# Patient Record
Sex: FEMALE | Race: WHITE | NOT HISPANIC OR LATINO | URBAN - METROPOLITAN AREA
[De-identification: names, ages, dates, MRNs, and addresses within clinical notes are randomized per-mention and may not be internally consistent; named-entity substitution may affect disease eponyms.]

---

## 2023-04-17 DIAGNOSIS — B00.9 HERPES: Primary | ICD-10-CM

## 2023-04-17 RX ORDER — CYCLOSPORINE 0.5 MG/ML
EMULSION OPHTHALMIC EVERY 12 HOURS
COMMUNITY
Start: 2022-10-04 | End: 2023-10-05 | Stop reason: SDUPTHER

## 2023-04-17 RX ORDER — VALACYCLOVIR HYDROCHLORIDE 500 MG/1
500 TABLET, FILM COATED ORAL DAILY
COMMUNITY
End: 2023-04-17 | Stop reason: SDUPTHER

## 2023-04-17 RX ORDER — BUPROPION HYDROCHLORIDE 150 MG/1
1 TABLET ORAL DAILY
COMMUNITY

## 2023-04-17 RX ORDER — VALACYCLOVIR HYDROCHLORIDE 500 MG/1
500 TABLET, FILM COATED ORAL DAILY
Qty: 90 TABLET | Refills: 1 | Status: SHIPPED | OUTPATIENT
Start: 2023-04-17 | End: 2023-12-18

## 2023-04-19 ENCOUNTER — TELEPHONE (OUTPATIENT)
Dept: PRIMARY CARE | Facility: CLINIC | Age: 33
End: 2023-04-19
Payer: COMMERCIAL

## 2023-04-19 DIAGNOSIS — Z12.31 ENCOUNTER FOR SCREENING MAMMOGRAM FOR BREAST CANCER: Primary | ICD-10-CM

## 2023-06-06 LAB
ANION GAP IN SER/PLAS: 11 MMOL/L (ref 10–20)
BASOPHILS (10*3/UL) IN BLOOD BY AUTOMATED COUNT: 0.04 X10E9/L (ref 0–0.1)
BASOPHILS/100 LEUKOCYTES IN BLOOD BY AUTOMATED COUNT: 0.7 % (ref 0–2)
CALCIUM (MG/DL) IN SER/PLAS: 9.7 MG/DL (ref 8.6–10.3)
CARBON DIOXIDE, TOTAL (MMOL/L) IN SER/PLAS: 30 MMOL/L (ref 21–32)
CHLORIDE (MMOL/L) IN SER/PLAS: 100 MMOL/L (ref 98–107)
CREATININE (MG/DL) IN SER/PLAS: 0.97 MG/DL (ref 0.5–1.05)
CREATININE (MG/DL) IN URINE: 41.2 MG/DL (ref 20–320)
EOSINOPHILS (10*3/UL) IN BLOOD BY AUTOMATED COUNT: 0.24 X10E9/L (ref 0–0.7)
EOSINOPHILS/100 LEUKOCYTES IN BLOOD BY AUTOMATED COUNT: 4.3 % (ref 0–6)
ERYTHROCYTE DISTRIBUTION WIDTH (RATIO) BY AUTOMATED COUNT: 12.4 % (ref 11.5–14.5)
ERYTHROCYTE MEAN CORPUSCULAR HEMOGLOBIN CONCENTRATION (G/DL) BY AUTOMATED: 33.9 G/DL (ref 32–36)
ERYTHROCYTE MEAN CORPUSCULAR VOLUME (FL) BY AUTOMATED COUNT: 94 FL (ref 80–100)
ERYTHROCYTES (10*6/UL) IN BLOOD BY AUTOMATED COUNT: 4.62 X10E12/L (ref 4–5.2)
GFR FEMALE: 79 ML/MIN/1.73M2
GLUCOSE (MG/DL) IN SER/PLAS: 80 MG/DL (ref 74–99)
HEMATOCRIT (%) IN BLOOD BY AUTOMATED COUNT: 43.3 % (ref 36–46)
HEMOGLOBIN (G/DL) IN BLOOD: 14.7 G/DL (ref 12–16)
IMMATURE GRANULOCYTES/100 LEUKOCYTES IN BLOOD BY AUTOMATED COUNT: 0.4 % (ref 0–0.9)
LEUKOCYTES (10*3/UL) IN BLOOD BY AUTOMATED COUNT: 5.6 X10E9/L (ref 4.4–11.3)
LYMPHOCYTES (10*3/UL) IN BLOOD BY AUTOMATED COUNT: 1.55 X10E9/L (ref 1.2–4.8)
LYMPHOCYTES/100 LEUKOCYTES IN BLOOD BY AUTOMATED COUNT: 27.9 % (ref 13–44)
MONOCYTES (10*3/UL) IN BLOOD BY AUTOMATED COUNT: 0.48 X10E9/L (ref 0.1–1)
MONOCYTES/100 LEUKOCYTES IN BLOOD BY AUTOMATED COUNT: 8.6 % (ref 2–10)
NEUTROPHILS (10*3/UL) IN BLOOD BY AUTOMATED COUNT: 3.23 X10E9/L (ref 1.2–7.7)
NEUTROPHILS/100 LEUKOCYTES IN BLOOD BY AUTOMATED COUNT: 58.1 % (ref 40–80)
PLATELETS (10*3/UL) IN BLOOD AUTOMATED COUNT: 300 X10E9/L (ref 150–450)
POTASSIUM (MMOL/L) IN SER/PLAS: 3.7 MMOL/L (ref 3.5–5.3)
PROTEIN (MG/DL) IN URINE: <4 MG/DL (ref 5–24)
PROTEIN/CREATININE (MG/MG) IN URINE: ABNORMAL MG/MG CREAT (ref 0–0.17)
SODIUM (MMOL/L) IN SER/PLAS: 137 MMOL/L (ref 136–145)
UREA NITROGEN (MG/DL) IN SER/PLAS: 12 MG/DL (ref 6–23)

## 2023-09-15 PROBLEM — R53.83 FATIGUE: Status: ACTIVE | Noted: 2023-09-15

## 2023-09-15 PROBLEM — F41.9 ANXIETY: Status: ACTIVE | Noted: 2023-09-15

## 2023-09-15 PROBLEM — N63.0 BREAST LUMP: Status: ACTIVE | Noted: 2023-09-15

## 2023-09-15 PROBLEM — Q87.19 SJOGREN-LARSSON SYNDROME (HHS-HCC): Status: ACTIVE | Noted: 2023-09-15

## 2023-09-15 PROBLEM — M32.9 SLE (SYSTEMIC LUPUS ERYTHEMATOSUS) (MULTI): Status: ACTIVE | Noted: 2023-09-15

## 2023-09-15 PROBLEM — R21 MALAR RASH: Status: ACTIVE | Noted: 2023-09-15

## 2023-09-15 PROBLEM — J20.9 ACUTE BRONCHITIS: Status: ACTIVE | Noted: 2023-09-15

## 2023-09-15 PROBLEM — R76.8 ANA POSITIVE: Status: ACTIVE | Noted: 2023-09-15

## 2023-09-15 PROBLEM — M35.00 SICCA SYNDROME (MULTI): Status: ACTIVE | Noted: 2023-09-15

## 2023-09-15 PROBLEM — Z97.3 WEARS CONTACT LENSES: Status: ACTIVE | Noted: 2023-09-15

## 2023-09-15 PROBLEM — E03.9 HYPOTHYROIDISM: Status: ACTIVE | Noted: 2023-09-15

## 2023-09-15 PROBLEM — M35.00 SJOGREN'S SYNDROME (MULTI): Status: ACTIVE | Noted: 2023-09-15

## 2023-09-15 PROBLEM — B00.1 RECURRENT COLD SORES: Status: ACTIVE | Noted: 2023-09-15

## 2023-09-15 RX ORDER — AMOXICILLIN 875 MG/1
1 TABLET, FILM COATED ORAL EVERY 12 HOURS
COMMUNITY
Start: 2023-06-06

## 2023-09-15 RX ORDER — HYDROXYCHLOROQUINE SULFATE 200 MG/1
1 TABLET, FILM COATED ORAL 2 TIMES DAILY
COMMUNITY
Start: 2022-10-04 | End: 2024-04-26 | Stop reason: SDUPTHER

## 2023-09-15 RX ORDER — LEVOTHYROXINE SODIUM 125 UG/1
1 TABLET ORAL DAILY
COMMUNITY
Start: 2019-09-12 | End: 2023-09-29

## 2023-09-29 DIAGNOSIS — E03.9 HYPOTHYROIDISM, UNSPECIFIED: ICD-10-CM

## 2023-09-29 RX ORDER — LEVOTHYROXINE SODIUM 125 UG/1
125 TABLET ORAL DAILY
Qty: 90 TABLET | Refills: 0 | Status: SHIPPED | OUTPATIENT
Start: 2023-09-29 | End: 2023-12-20

## 2023-10-05 DIAGNOSIS — M35.01 SJOGREN'S SYNDROME WITH KERATOCONJUNCTIVITIS SICCA (MULTI): Primary | ICD-10-CM

## 2023-10-05 RX ORDER — CYCLOSPORINE 0.5 MG/ML
1 EMULSION OPHTHALMIC EVERY 12 HOURS
Qty: 60 EACH | Refills: 3 | Status: SHIPPED | OUTPATIENT
Start: 2023-10-05 | End: 2023-10-30 | Stop reason: ALTCHOICE

## 2023-10-09 ENCOUNTER — SPECIALTY PHARMACY (OUTPATIENT)
Dept: PHARMACY | Facility: CLINIC | Age: 33
End: 2023-10-09

## 2023-10-17 ENCOUNTER — TELEMEDICINE (OUTPATIENT)
Dept: RHEUMATOLOGY | Facility: CLINIC | Age: 33
End: 2023-10-17
Payer: COMMERCIAL

## 2023-10-17 DIAGNOSIS — M35.00 SJOGREN'S SYNDROME, WITH UNSPECIFIED ORGAN INVOLVEMENT (MULTI): Primary | ICD-10-CM

## 2023-10-17 DIAGNOSIS — R92.8 ABNORMAL MAMMOGRAM OF LEFT BREAST: ICD-10-CM

## 2023-10-17 DIAGNOSIS — M32.9 SYSTEMIC LUPUS ERYTHEMATOSUS, UNSPECIFIED SLE TYPE, UNSPECIFIED ORGAN INVOLVEMENT STATUS (MULTI): ICD-10-CM

## 2023-10-17 PROCEDURE — 99214 OFFICE O/P EST MOD 30 MIN: CPT | Performed by: INTERNAL MEDICINE

## 2023-10-17 ASSESSMENT — ENCOUNTER SYMPTOMS
DEPRESSION: 0
OCCASIONAL FEELINGS OF UNSTEADINESS: 0
LOSS OF SENSATION IN FEET: 0

## 2023-10-17 ASSESSMENT — PATIENT HEALTH QUESTIONNAIRE - PHQ9
2. FEELING DOWN, DEPRESSED OR HOPELESS: NOT AT ALL
SUM OF ALL RESPONSES TO PHQ9 QUESTIONS 1 AND 2: 0
1. LITTLE INTEREST OR PLEASURE IN DOING THINGS: NOT AT ALL

## 2023-10-17 NOTE — PATIENT INSTRUCTIONS
We will start appeal to try to get Restasis covered.  Check labs December 2023: CBC with differential, BMP, double-stranded DNA, C3, C4, spot urine protein to creatinine ratio.  Please have a copy of your eye exam from January faxed to my office at [phone].  Follow-up in 4 months.

## 2023-10-17 NOTE — PROGRESS NOTES
"Subjective   Patient ID: Rajwinder Marley is a 32 y.o. female who presents for Follow-up. Virtual visit is being done.    HPI   32 year old female here for follow-up regarding SLE and Sjogren syndrome. (NICK 1:640 homogeneous pattern, SSA+, SSB-, dsDNA negative).    She developed symptoms of dry eyes and dry mouth 4/21.  She does wear contacts, but she was needing to use rewetting drops approximately every 5 minutes.  They tried placing lacrimal plugs which were ineffective.    She also developed a malar rash    She started treatment with Restasis, which has helped.  She states that Restasis has been \"life changing.\"  Restasis has helped tremendously with her dry eyes (she had previously failed over-the-counter tears and lacrimal plugs).  However, her insurance is again not covering the Restasis.  She is still using Biotene eyedrops. She needs to use drops frequently (sometimes every 5 minutes, sometimes every 30 minutes which is an improvement from what she was doing previously.)  She had an eye exam by [facility] January 2023. Reportedly she return for baseline 10-2 VF and OCT. I have asked her to send a copy of this to me.    She also started hydroxychloroquine 10/22 because she showed me photographs consistent with a malar rash (consistent with acute cutaneous lupus).  The rash resolved and she has not had a recurrence.    She also has dry mouth symptoms, which she is managing with drinking water frequently.  She has not been having excessive dental caries.  She sees her dentist every 6 months.  She notes that sometimes she has what she refers to as gum problems if she does not floss twice daily.    She currently denies joint pain.  She denies history of Raynaud's phenomenon.  She has no history of miscarriage or DVT.  She has some hair loss but it is not excessive.    Palpable lump in her right breast 7/23 was determined on mammogram and ultrasound to be a cluster of cysts.  She did have some calcifications in " the left breast that were felt to likely be benign, but 6-month follow-up was recommended (this is scheduled for January 9, 2024).      Labs August 2022: CBC normal, CMP normal, ESR 14, CRP 0.54 (normal less than 1), TSH 2.03, vitamin B12 470, NICK 1:640 (homogeneous), SSA+, SSB-, dsDNA negative, Estrella and RNP negative, urine microalbumin negative  Labs November 2022: Anticardiolipin antibody negative, beta-2 glycoprotein antibody negative, lupus anticoagulant negative, C3 and C4 normal  Labs June 2023: CBC with differential normal, BMP normal, spot urine protein to creatinine ratio 0    Medical problem list:  - SLE   -Sjogren's syndrome   -Hypothyroidism (onset while she was in high school)   -Anxiety    Medications: Reviewed as documented  Surgeries: None    Social history: .   Has 2 children-her wife was a biologic carrier of the children. Children are ages 3 years and 16 months.   Occupation: .   Denies use of tobacco and alcohol.    Family history: Mother, 2 maternal aunts, and maternal grandmother have hypothyroidism.   Denies history of Sjogren's syndrome, SLE, or rheumatoid arthritis.    ROS:  General: Denies fevers or chills.  CV: Denies chest pain or palpitations.  Denies leg edema.  Lungs: Denies coughing or shortness of breath.  Skin: Denies rashes or nodules.  MS: Denies joint pain or joint swelling.     Objective   There were no vitals taken for this visit.    Physical Exam  HEENT: External exam of eyes and ears is normal.  No malar rash present. No parotid swelling.  CV: No apparent edema.  Lungs: Normal respiratory effort.  Neuro: Affect appropriate.    Assessment/Plan   Problem List Items Addressed This Visit             ICD-10-CM    SLE (systemic lupus erythematosus) (CMS/[facility]) M32.9    Sjogren's syndrome (CMS/[facility]) - Primary M35.00     Other Visit Diagnoses         Codes    Abnormal mammogram of left breast     R92.8           SLE and Sjogren's syndrome-had onset of  "symptoms of dry eyes , dry mouth, and malar rash approximately April 2021.  Labs remarkable for positive NICK 1:640 with positive SSA antibody.    Symptoms have improved with addition of hydroxychloroquine (started 10/22) and Restasis,  She states that Restasis has been \"life-changing\".  She previously failed over-the-counter artificial tears and lacrimal plugs.  Her insurance is not covering the Restasis, so we will need to do an appeal.  She had baseline eye exam January 2023 with [facility], returned for for 10-2 VF and OCT.  I requested again that she send me a copy of the eye exam.    Lump in right lateral breast 7/23-correlated with a cluster of cysts on mammogram and ultrasound July 2023.  She does have order likely benign calcifications in the left breast, for which 6-month follow-up mammogram was recommended (scheduled for January 9, 2024).      Plan:  We will start appeal to try to get Restasis covered.  Check labs December 2023: CBC with differential, BMP, double-stranded DNA, C3, C4, spot urine protein to creatinine ratio.  Please have a copy of your eye exam from January faxed to my office at [phone].  Follow-up in 4 months.    "

## 2023-10-30 DIAGNOSIS — M35.01 SJOGREN'S SYNDROME WITH KERATOCONJUNCTIVITIS SICCA (MULTI): ICD-10-CM

## 2023-10-30 DIAGNOSIS — M35.00 SJOGREN'S SYNDROME, WITH UNSPECIFIED ORGAN INVOLVEMENT (MULTI): Primary | ICD-10-CM

## 2023-10-30 DIAGNOSIS — M35.00 SJOGREN'S SYNDROME, WITH UNSPECIFIED ORGAN INVOLVEMENT (MULTI): ICD-10-CM

## 2023-10-30 RX ORDER — CYCLOSPORINE 0.5 MG/ML
EMULSION OPHTHALMIC
Qty: 60 EACH | Refills: 3 | Status: SHIPPED | OUTPATIENT
Start: 2023-10-30 | End: 2023-12-28

## 2023-10-30 RX ORDER — CYCLOSPORINE 0.5 MG/ML
1 EMULSION OPHTHALMIC EVERY 12 HOURS
Qty: 60 EACH | Refills: 3 | Status: SHIPPED | OUTPATIENT
Start: 2023-10-30 | End: 2023-10-30 | Stop reason: SDUPTHER

## 2023-11-03 ENCOUNTER — PHARMACY VISIT (OUTPATIENT)
Dept: PHARMACY | Facility: CLINIC | Age: 33
End: 2023-11-03
Payer: COMMERCIAL

## 2023-11-29 DIAGNOSIS — M35.01 SJOGREN SYNDROME WITH KERATOCONJUNCTIVITIS (MULTI): Primary | ICD-10-CM

## 2023-12-07 ENCOUNTER — SPECIALTY PHARMACY (OUTPATIENT)
Dept: PHARMACY | Facility: CLINIC | Age: 33
End: 2023-12-07

## 2023-12-07 PROCEDURE — RXMED WILLOW AMBULATORY MEDICATION CHARGE

## 2023-12-08 ENCOUNTER — PHARMACY VISIT (OUTPATIENT)
Dept: PHARMACY | Facility: CLINIC | Age: 33
End: 2023-12-08
Payer: COMMERCIAL

## 2023-12-17 DIAGNOSIS — B00.9 HERPES: ICD-10-CM

## 2023-12-18 RX ORDER — VALACYCLOVIR HYDROCHLORIDE 500 MG/1
500 TABLET, FILM COATED ORAL DAILY
Qty: 90 TABLET | Refills: 1 | Status: SHIPPED | OUTPATIENT
Start: 2023-12-18 | End: 2024-02-05 | Stop reason: SDUPTHER

## 2023-12-20 DIAGNOSIS — E03.9 HYPOTHYROIDISM, UNSPECIFIED: ICD-10-CM

## 2023-12-20 RX ORDER — LEVOTHYROXINE SODIUM 125 UG/1
125 TABLET ORAL DAILY
Qty: 90 TABLET | Refills: 0 | Status: SHIPPED | OUTPATIENT
Start: 2023-12-20 | End: 2024-03-18

## 2023-12-28 ENCOUNTER — OFFICE VISIT (OUTPATIENT)
Dept: OPHTHALMOLOGY | Facility: CLINIC | Age: 33
End: 2023-12-28
Payer: COMMERCIAL

## 2023-12-28 DIAGNOSIS — H52.13 MYOPIA OF BOTH EYES: Primary | ICD-10-CM

## 2023-12-28 DIAGNOSIS — H52.223 REGULAR ASTIGMATISM OF BOTH EYES: ICD-10-CM

## 2023-12-28 DIAGNOSIS — Z51.81 ENCOUNTER FOR MONITORING OF HYDROXYCHLOROQUINE THERAPY: ICD-10-CM

## 2023-12-28 DIAGNOSIS — Z79.899 ENCOUNTER FOR MONITORING OF HYDROXYCHLOROQUINE THERAPY: ICD-10-CM

## 2023-12-28 DIAGNOSIS — H47.231 OPTIC CUPPING OF RIGHT EYE: ICD-10-CM

## 2023-12-28 DIAGNOSIS — H16.223 KERATOCONJUNCTIVITIS SICCA OF BOTH EYES DUE TO DECREASED TEAR PRODUCTION: ICD-10-CM

## 2023-12-28 DIAGNOSIS — M35.01 SJOGREN SYNDROME WITH KERATOCONJUNCTIVITIS (MULTI): ICD-10-CM

## 2023-12-28 PROCEDURE — 92083 EXTENDED VISUAL FIELD XM: CPT | Performed by: OPTOMETRIST

## 2023-12-28 PROCEDURE — 92015 DETERMINE REFRACTIVE STATE: CPT | Performed by: OPTOMETRIST

## 2023-12-28 PROCEDURE — V2520 CONTACT LENS HYDROPHILIC: HCPCS | Performed by: OPTOMETRIST

## 2023-12-28 PROCEDURE — 92134 CPTRZ OPH DX IMG PST SGM RTA: CPT | Mod: BILATERAL PROCEDURE | Performed by: OPTOMETRIST

## 2023-12-28 PROCEDURE — 92004 COMPRE OPH EXAM NEW PT 1/>: CPT | Performed by: OPTOMETRIST

## 2023-12-28 PROCEDURE — FLVLG CONTACT LENS EVAL - LEVEL 2 (SP): Performed by: OPTOMETRIST

## 2023-12-28 RX ORDER — CYCLOSPORINE 0 G/ML
1 SOLUTION/ DROPS OPHTHALMIC; TOPICAL 2 TIMES DAILY
Qty: 60 EACH | Refills: 3 | Status: SHIPPED | OUTPATIENT
Start: 2023-12-28

## 2023-12-28 ASSESSMENT — REFRACTION_CURRENTRX
OS_BASECURVE: 8.6
OD_SPHERE: -3.25
OS_BRAND: CLARITI 1 DAY
OS_CYLINDER: SPHERE
OS_DIAMETER: 14.1
OS_SPHERE: -3.25
OD_DIAMETER: 14.1
OD_BASECURVE: 8.6
OD_CYLINDER: SPHERE
OD_BRAND: CLARITI 1 DAY

## 2023-12-28 ASSESSMENT — ENCOUNTER SYMPTOMS
EYES NEGATIVE: 1
ENDOCRINE NEGATIVE: 1
NEUROLOGICAL NEGATIVE: 0
CARDIOVASCULAR NEGATIVE: 0
HEMATOLOGIC/LYMPHATIC NEGATIVE: 0
ALLERGIC/IMMUNOLOGIC NEGATIVE: 1
GASTROINTESTINAL NEGATIVE: 0
MUSCULOSKELETAL NEGATIVE: 0
RESPIRATORY NEGATIVE: 0
CONSTITUTIONAL NEGATIVE: 0
PSYCHIATRIC NEGATIVE: 0

## 2023-12-28 ASSESSMENT — REFRACTION_MANIFEST
OS_CYLINDER: -0.50
OS_AXIS: 068
OD_CYLINDER: -1.25
OS_SPHERE: -3.00
OD_AXIS: 124
OD_SPHERE: -2.50

## 2023-12-28 ASSESSMENT — CONF VISUAL FIELD
OS_SUPERIOR_TEMPORAL_RESTRICTION: 0
OD_INFERIOR_TEMPORAL_RESTRICTION: 0
OS_INFERIOR_TEMPORAL_RESTRICTION: 0
OD_SUPERIOR_NASAL_RESTRICTION: 0
METHOD: COUNTING FINGERS
OS_NORMAL: 1
OD_INFERIOR_NASAL_RESTRICTION: 0
OS_SUPERIOR_NASAL_RESTRICTION: 0
OD_SUPERIOR_TEMPORAL_RESTRICTION: 0
OS_INFERIOR_NASAL_RESTRICTION: 0
OD_NORMAL: 1

## 2023-12-28 ASSESSMENT — SLIT LAMP EXAM - LIDS: COMMENTS: 2+ MEIBOMIAN GLAND DYSFUNCTION

## 2023-12-28 ASSESSMENT — TONOMETRY
IOP_METHOD: GOLDMANN APPLANATION
OS_IOP_MMHG: 13
OD_IOP_MMHG: 14

## 2023-12-28 ASSESSMENT — CUP TO DISC RATIO: OD_RATIO: 0.55

## 2023-12-28 ASSESSMENT — TEAR MENISCUS
OD_TEAR_MENISCUS: LOW
OS_TEAR_MENISCUS: LOW

## 2023-12-28 ASSESSMENT — VISUAL ACUITY
OS_CC: 20/20
CORRECTION_TYPE: CONTACTS
OS_CC+: -1
OD_CC+: -1
OD_CC: 20/20
METHOD: SNELLEN - LINEAR

## 2023-12-28 ASSESSMENT — EXTERNAL EXAM - RIGHT EYE: OD_EXAM: NORMAL

## 2023-12-28 ASSESSMENT — TEAR BREAK UP TIME (TBUT)
OS_TBUT: 1
OD_TBUT: 1

## 2023-12-28 ASSESSMENT — PACHYMETRY
OD_CT(UM): 526
OS_CT(UM): 531

## 2023-12-28 ASSESSMENT — EXTERNAL EXAM - LEFT EYE: OS_EXAM: NORMAL

## 2023-12-28 NOTE — PROGRESS NOTES
Assessment/Plan   Diagnoses and all orders for this visit:  Myopia of both eyes  Regular astigmatism of both eyes  New spec rx released today per patient request. Ocular health wnl for age OU. Monitor 1 year or sooner prn. Refraction billed today.  Discussed proper wear, care, replacement of contact lenses. Gave handout. D/c cl wear and RTC if eyes become red, painful, irritated. Monitor 1 year.   CL eval billed today. $55 Trials of infuse ordered, patient switch if prefers. Cl Rx finalized.     Sjogren syndrome with keratoconjunctivitis (CMS/[facility])  Encounter for monitoring of hydroxychloroquine therapy  -     Martin Visual Field - OU - Both Eyes  -     OCT, Retina - OU - Both Eyes  Studies reveal that the risk of maculopathy when taking Plaquenil is 0% (0-5 years), 1% (over 5 years), 2% (over 10 years), and 20% cumulative, or 4% annual incidence (over 20 years) respectively.  Annual testing with 10-2 threshold visual field perimetry, as well as spectral domain optical coherence tomography of the macula is recommended. No signs of plaquenil toxicity today od/os, macula OCT today, HVF 10-2 today  monitor 1 year or sooner with any acute vision change. HVF 10-2 and OCT Mac at next comprehensive exam.    Keratoconjunctivitis sicca of both eyes due to decreased tear production  -     cycloSPORINE (Cequa) 0.09 % dropperette; Administer 1 drop into affected eye(s) 2 times a day.  Patient educated on exam findings. Patient failed treatement with aqueous and lipid based Ats for 120 days and restasis/cyclosporine 0.05%. Start Cequa OU  bid. Discussed SEs of drug. Discussed lag for full efficacy of drug. RTC 4 months for osmolarity, OSDI, Schirmers B and SLE.  Add WC qday OU x 8-10min.     Optic cupping of right eye  Stable. No evidence of glaucomatous change. Physiologic asymmetry. Monitor 1 year.

## 2023-12-28 NOTE — Clinical Note
No plaquenil toxicity. Pt has failed tx w/ restasis, switching to alternative treatments for dry eye, will monitor in 4 months.

## 2023-12-28 NOTE — Clinical Note
Both eyes (OU) Contact Lens Order  Quantity: 1 Package: 90 PK Appointment needed? No Medically necessary? No Ship To: Home Additional instructions: Please order 1 90pk of clariti to mail to patient. Please also order 3 trial pack of the infuse lenses (Rx 2). Did not have trials in office today and patient is considering switching if better than clariti. Please mail to patient. Charges for clariti 90pk in epic.

## 2024-01-09 ENCOUNTER — ANCILLARY PROCEDURE (OUTPATIENT)
Dept: RADIOLOGY | Facility: CLINIC | Age: 34
End: 2024-01-09
Payer: COMMERCIAL

## 2024-01-09 DIAGNOSIS — R92.8 OTHER ABNORMAL AND INCONCLUSIVE FINDINGS ON DIAGNOSTIC IMAGING OF BREAST: ICD-10-CM

## 2024-01-09 PROCEDURE — 77065 DX MAMMO INCL CAD UNI: CPT | Mod: LEFT SIDE | Performed by: STUDENT IN AN ORGANIZED HEALTH CARE EDUCATION/TRAINING PROGRAM

## 2024-01-09 PROCEDURE — 77065 DX MAMMO INCL CAD UNI: CPT | Mod: LT

## 2024-01-10 ENCOUNTER — TELEPHONE (OUTPATIENT)
Dept: PRIMARY CARE | Facility: CLINIC | Age: 34
End: 2024-01-10

## 2024-01-10 ENCOUNTER — APPOINTMENT (OUTPATIENT)
Dept: PRIMARY CARE | Facility: CLINIC | Age: 34
End: 2024-01-10
Payer: COMMERCIAL

## 2024-01-10 NOTE — TELEPHONE ENCOUNTER
1/10/24 sent My Chart message:     Rajwinder-    Just reaching out to you to see if we could rescheduled your physical/ annual appointment that we had to cancel due to Dr. Harris was not in office the day it was scheduled.    Please call office at [phone]    Thanks  Sade Harris     (-was on ns/cx list)

## 2024-01-12 ENCOUNTER — APPOINTMENT (OUTPATIENT)
Dept: PRIMARY CARE | Facility: CLINIC | Age: 34
End: 2024-01-12
Payer: COMMERCIAL

## 2024-02-05 ENCOUNTER — TELEMEDICINE (OUTPATIENT)
Dept: RHEUMATOLOGY | Facility: CLINIC | Age: 34
End: 2024-02-05
Payer: COMMERCIAL

## 2024-02-05 DIAGNOSIS — B00.9 HERPES: ICD-10-CM

## 2024-02-05 DIAGNOSIS — M35.01 SJOGREN'S SYNDROME WITH KERATOCONJUNCTIVITIS SICCA (MULTI): ICD-10-CM

## 2024-02-05 DIAGNOSIS — M32.9 SYSTEMIC LUPUS ERYTHEMATOSUS, UNSPECIFIED SLE TYPE, UNSPECIFIED ORGAN INVOLVEMENT STATUS (MULTI): Primary | ICD-10-CM

## 2024-02-05 PROBLEM — Z86.39 HISTORY OF THYROID DISORDER: Status: ACTIVE | Noted: 2024-02-05

## 2024-02-05 PROBLEM — Z97.3 WEARS EYEGLASSES: Status: ACTIVE | Noted: 2024-02-05

## 2024-02-05 PROBLEM — E66.9 OBESITY: Status: ACTIVE | Noted: 2024-02-05

## 2024-02-05 PROBLEM — Q87.19 SJOGREN-LARSSON SYNDROME (HHS-HCC): Status: RESOLVED | Noted: 2023-09-15 | Resolved: 2024-02-05

## 2024-02-05 PROBLEM — S99.929A INJURY OF FOOT: Status: ACTIVE | Noted: 2024-02-05

## 2024-02-05 PROCEDURE — 99213 OFFICE O/P EST LOW 20 MIN: CPT | Performed by: INTERNAL MEDICINE

## 2024-02-05 RX ORDER — VALACYCLOVIR HYDROCHLORIDE 500 MG/1
500 TABLET, FILM COATED ORAL DAILY
Qty: 90 TABLET | Refills: 2 | Status: SHIPPED | OUTPATIENT
Start: 2024-02-05

## 2024-02-05 NOTE — PROGRESS NOTES
"Subjective   Patient ID: Rajwinder Marley is a 33 y.o. female who presents for Follow-up.    HPI 32 year old female here for follow-up regarding SLE and Sjogren syndrome. (NICK 1:640 homogeneous pattern, SSA+, SSB-, dsDNA negative).     She developed symptoms of dry eyes and dry mouth 4/21.  She does wear contacts, but she was needing to use rewetting drops approximately every 5 minutes.  They tried placing lacrimal plugs which were ineffective.     She also developed a malar rash- she has not had this recently.     She started treatment with Restasis, which has helped.  She states that Restasis has been \"life changing.\"    She also started hydroxychloroquine 10/22 because she showed me photographs consistent with a malar rash (consistent with acute cutaneous lupus).  The rash resolved and she has not had a recurrence.      She currently denies dry mouth.  She denies malar rash.  She denies joint pain except left lateral ankle pain which occurs randomly. She last had it 2 weeks ago.    She denies history of Raynaud's phenomenon.  She has no history of miscarriage or DVT.  She has some hair loss but it is not excessive.     Labs August 2022: CBC normal, CMP normal, ESR 14, CRP 0.54 (normal less than 1), TSH 2.03, vitamin B12 470, NICK 1:640 (homogeneous), SSA+, SSB-, dsDNA negative, Estrella and RNP negative, urine microalbumin negative  Labs November 2022: Anticardiolipin antibody negative, beta-2 glycoprotein antibody negative, lupus anticoagulant negative, C3 and C4 normal  Labs June 2023: CBC with differential normal, BMP normal, spot urine protein to creatinine ratio 0    Baseline mammogram 7/23- had follow up left diagnostic mammogram and ultrasound with benign findings.     Medical problem list:  - SLE   -Sjogren's syndrome   -Hypothyroidism (onset while she was in high school)   -Anxiety     Medications: Reviewed as documented  Surgeries: None     Social history: .   Has 2 children-her wife was a biologic " "carrier of the children. Children are ages 3 years and 16 months.   Occupation: .   Denies use of tobacco and alcohol.     Family history: Mother, 2 maternal aunts, and maternal grandmother have hypothyroidism.   Denies history of Sjogren's syndrome, SLE, or rheumatoid arthritis.     Eye exam 12/23: includes OCT and 10-2 VF. (Dr. Wheeler)     Objective   There were no vitals taken for this visit.    Physical Exam  HEENT: External exam of the eyes and ears is normal.  No parotid swelling noted.  CV: No apparent edema.  Lungs: Normal respiratory effort.  Neuro: Affect appropriate.    Assessment/Plan   Problem List Items Addressed This Visit             ICD-10-CM    SLE (systemic lupus erythematosus) (CMS/[facility]) - Primary M32.9    Sjogren's syndrome (CMS/[facility]) M35.00     Other Visit Diagnoses         Codes    Herpes     B00.9    Relevant Medications    valACYclovir (Valtrex) 500 mg tablet            SLE and Sjogren's syndrome-had onset of symptoms of dry eyes , dry mouth, and malar rash approximately April 2021.  Labs remarkable for positive NICK 1:640 with positive SSA antibody.  Clinically she is doing well.     Symptoms have improved with addition of hydroxychloroquine (started 10/22) and Restasis,  She states that Restasis has been \"life-changing\".  She previously failed over-the-counter artificial tears and lacrimal plugs.  Her insurance is not covering the Restasis, so we will need to do an appeal.  Last eye exam was 12/23 with Dr. Wheeler (includes OCT and 10-2 VF).      Plan:  Check CBC with differential, BMP, double-stranded DNA, C3, C4, ESR, spot urine protein to creatinine ratio.  Try reducing hydroxychloroquine to 300 mg daily.  Follow-up in 6 months.  Phone sooner if needed.           "

## 2024-03-15 ENCOUNTER — TELEPHONE (OUTPATIENT)
Dept: OPHTHALMOLOGY | Facility: CLINIC | Age: 34
End: 2024-03-15
Payer: COMMERCIAL

## 2024-03-18 DIAGNOSIS — E03.9 HYPOTHYROIDISM, UNSPECIFIED: ICD-10-CM

## 2024-03-18 RX ORDER — LEVOTHYROXINE SODIUM 125 UG/1
125 TABLET ORAL DAILY
Qty: 90 TABLET | Refills: 0 | Status: SHIPPED | OUTPATIENT
Start: 2024-03-18 | End: 2024-04-26 | Stop reason: SDUPTHER

## 2024-03-18 NOTE — TELEPHONE ENCOUNTER
3/18/24 called left message that Dr. Harris received rx request but haven't seen her since 8/22, need an appointment before medication will be sent. Dr. Harris did offer to bridge to her apt but I will not fill until apt scheduled as it has been way too long.

## 2024-04-24 ENCOUNTER — APPOINTMENT (OUTPATIENT)
Dept: OPHTHALMOLOGY | Facility: CLINIC | Age: 34
End: 2024-04-24
Payer: COMMERCIAL

## 2024-04-26 DIAGNOSIS — M35.00 SJOGREN SYNDROME, UNSPECIFIED (MULTI): Primary | ICD-10-CM

## 2024-04-26 DIAGNOSIS — E03.9 HYPOTHYROIDISM, UNSPECIFIED: ICD-10-CM

## 2024-04-26 DIAGNOSIS — E03.9 HYPOTHYROIDISM, UNSPECIFIED TYPE: Primary | ICD-10-CM

## 2024-04-26 RX ORDER — HYDROXYCHLOROQUINE SULFATE 200 MG/1
200 TABLET, FILM COATED ORAL 2 TIMES DAILY
Qty: 180 TABLET | Refills: 1 | Status: SHIPPED | OUTPATIENT
Start: 2024-04-26

## 2024-04-26 RX ORDER — LEVOTHYROXINE SODIUM 125 UG/1
125 TABLET ORAL DAILY
Qty: 90 TABLET | Refills: 0 | Status: SHIPPED | OUTPATIENT
Start: 2024-04-26

## 2024-06-14 ENCOUNTER — APPOINTMENT (OUTPATIENT)
Dept: PRIMARY CARE | Facility: CLINIC | Age: 34
End: 2024-06-14
Payer: COMMERCIAL

## 2024-07-24 DIAGNOSIS — H16.223 KERATOCONJUNCTIVITIS SICCA OF BOTH EYES DUE TO DECREASED TEAR PRODUCTION: ICD-10-CM

## 2024-07-24 RX ORDER — CYCLOSPORINE 0 G/ML
1 SOLUTION/ DROPS OPHTHALMIC; TOPICAL 2 TIMES DAILY
Qty: 60 EACH | Refills: 3 | Status: SHIPPED | OUTPATIENT
Start: 2024-07-24 | End: 2024-08-02 | Stop reason: SDUPTHER

## 2024-07-24 RX ORDER — CYCLOSPORINE 0 G/ML
1 SOLUTION/ DROPS OPHTHALMIC; TOPICAL 2 TIMES DAILY
Qty: 60 EACH | Refills: 3 | Status: SHIPPED | OUTPATIENT
Start: 2024-07-24 | End: 2024-07-24

## 2024-08-02 RX ORDER — CYCLOSPORINE 0 G/ML
1 SOLUTION/ DROPS OPHTHALMIC; TOPICAL 2 TIMES DAILY
Qty: 60 EACH | Refills: 6 | Status: SHIPPED | OUTPATIENT
Start: 2024-08-02

## 2024-08-06 ENCOUNTER — APPOINTMENT (OUTPATIENT)
Dept: RHEUMATOLOGY | Facility: CLINIC | Age: 34
End: 2024-08-06
Payer: COMMERCIAL

## 2024-08-06 DIAGNOSIS — M32.9 SYSTEMIC LUPUS ERYTHEMATOSUS, UNSPECIFIED SLE TYPE, UNSPECIFIED ORGAN INVOLVEMENT STATUS (MULTI): Primary | ICD-10-CM

## 2024-08-06 DIAGNOSIS — M35.01 SJOGREN'S SYNDROME WITH KERATOCONJUNCTIVITIS SICCA (MULTI): ICD-10-CM

## 2024-08-06 PROCEDURE — 1036F TOBACCO NON-USER: CPT | Performed by: INTERNAL MEDICINE

## 2024-08-06 PROCEDURE — 99214 OFFICE O/P EST MOD 30 MIN: CPT | Performed by: INTERNAL MEDICINE

## 2024-08-06 ASSESSMENT — PATIENT HEALTH QUESTIONNAIRE - PHQ9
1. LITTLE INTEREST OR PLEASURE IN DOING THINGS: NOT AT ALL
2. FEELING DOWN, DEPRESSED OR HOPELESS: NOT AT ALL
SUM OF ALL RESPONSES TO PHQ9 QUESTIONS 1 AND 2: 0

## 2024-08-06 NOTE — PROGRESS NOTES
"Subjective   Patient ID: Rajwinder Marley is a 33 y.o. female who presents for follow up regarding Sjogren's syndrome.  Virtual visit is being done today.  She is at home at the time of the visit.  Verbal consent was given.    HPI 33 year old female here for follow-up regarding SLE and Sjogren syndrome. (NICK 1:640 homogeneous pattern, SSA+, SSB-, dsDNA negative).     She developed symptoms of dry eyes and dry mouth 4/21.  She does wear contacts, but she was needing to use rewetting drops approximately every 5 minutes.  They tried placing lacrimal plugs which were ineffective.     She also developed a malar rash- she has not had this recently.     She started treatment with Restasis, which has helped.  She states that Restasis has been \"life changing.\"  She is now on Cequa, which helps significantly.    She also started hydroxychloroquine 10/22 because she showed me photographs consistent with a malar rash (consistent with acute cutaneous lupus).  The rash resolved and she has not had a recurrence.      She currently denies dry mouth.  She denies malar rash.  She gets intermittent left ankle pain but otherwise denies joint pain.    She does get intermittent headaches.  She recently completed her menstrual cycle, she has had a left-sided temporal headache over the last 2 days.  She does not get nausea or vomiting.  She denies scotoma.  She has not take anything for this since it is already getting better.  She does occasionally use Motrin 600 mg for the headaches.    She denies history of Raynaud's phenomenon.  She has no history of miscarriage or DVT.  She has some hair loss but it is not excessive.     Labs August 2022: CBC normal, CMP normal, ESR 14, CRP 0.54 (normal less than 1), TSH 2.03, vitamin B12 470, NICK 1:640 (homogeneous), SSA+, SSB-, dsDNA negative, Estrella and RNP negative, urine microalbumin negative  Labs November 2022: Anticardiolipin antibody negative, beta-2 glycoprotein antibody negative, lupus " "anticoagulant negative, C3 and C4 normal  Labs June 2023: CBC with differential normal, BMP normal, spot urine protein to creatinine ratio 0    Baseline mammogram 7/23- had follow up left diagnostic mammogram and ultrasound with benign findings.     Medical problem list:  - SLE   -Sjogren's syndrome   -Hypothyroidism (onset while she was in high school)   -Anxiety     Medications: Reviewed as documented  Surgeries: None     Social history: .   Has 2 children-her wife was a biologic carrier of the children. Children are ages 3 years and 16 months.   Occupation: .   Denies use of tobacco and alcohol.     Family history: Mother, 2 maternal aunts, and maternal grandmother have hypothyroidism.   Denies history of Sjogren's syndrome, SLE, or rheumatoid arthritis.     Eye exam 12/23: includes OCT and 10-2 VF. (Dr. Wheeler)    ROS:  General: Denies fevers or chills.  CV: Denies chest pain or palpitations.  Denies leg edema.  Lungs: Denies coughing or shortness of breath.  Skin: Denies rashes or nodules.  MS: Denies joint pain or joint swelling.      Objective   There were no vitals taken for this visit.    Physical Exam  HEENT: External exam of the eyes and ears is normal.  No parotid swelling noted.  CV: No apparent edema.  Lungs: Normal respiratory effort.  Neuro: Affect appropriate.    Assessment/Plan   Problem List Items Addressed This Visit             ICD-10-CM    SLE (systemic lupus erythematosus) (Multi) - Primary M32.9    Sjogren's syndrome (Multi) M35.00           SLE and Sjogren's syndrome-had onset of symptoms of dry eyes , dry mouth, and malar rash approximately April 2021.  Labs remarkable for positive NICK 1:640 with positive SSA antibody.  Clinically she is doing well.     Symptoms have improved with addition of hydroxychloroquine (started 10/22) and Restasis,  She states that Restasis has been \"life-changing\".  She previously failed over-the-counter artificial tears and " lacrimal plugs.  Her insurance has approved Cequa, which is working well.  Last eye exam was 12/23 with Dr. Wheeler (includes OCT and 10-2 VF).    Intermittent headaches-seen possibly associated with menstrual cycle.  She has a headache today which is better than it was yesterday.  She did not need to take anything today for the headache.      Plan:  Check CBC with differential, BMP, double-stranded DNA, C3, C4, ESR, spot urine protein to creatinine ratio.  Follow-up in 6 months.  Phone sooner if needed.

## 2024-08-26 DIAGNOSIS — H16.223 KERATOCONJUNCTIVITIS SICCA OF BOTH EYES DUE TO DECREASED TEAR PRODUCTION: ICD-10-CM

## 2024-08-26 DIAGNOSIS — M35.00 SJOGREN SYNDROME, UNSPECIFIED (MULTI): ICD-10-CM

## 2024-08-26 RX ORDER — CYCLOSPORINE 0 G/ML
1 SOLUTION/ DROPS OPHTHALMIC; TOPICAL 2 TIMES DAILY
Qty: 60 EACH | Refills: 6 | Status: SHIPPED | OUTPATIENT
Start: 2024-08-26

## 2024-08-26 RX ORDER — HYDROXYCHLOROQUINE SULFATE 200 MG/1
200 TABLET, FILM COATED ORAL 2 TIMES DAILY
Qty: 180 TABLET | Refills: 1 | Status: SHIPPED | OUTPATIENT
Start: 2024-08-26

## 2024-09-10 ENCOUNTER — TELEPHONE (OUTPATIENT)
Dept: OPHTHALMOLOGY | Facility: CLINIC | Age: 34
End: 2024-09-10

## 2024-09-12 ENCOUNTER — DOCUMENTATION (OUTPATIENT)
Dept: OPHTHALMOLOGY | Facility: CLINIC | Age: 34
End: 2024-09-12
Payer: COMMERCIAL

## 2024-09-12 DIAGNOSIS — M35.01 SJOGREN SYNDROME WITH KERATOCONJUNCTIVITIS (MULTI): Primary | ICD-10-CM

## 2024-09-12 DIAGNOSIS — H16.223 KERATOCONJUNCTIVITIS SICCA OF BOTH EYES DUE TO DECREASED TEAR PRODUCTION: ICD-10-CM

## 2024-09-12 RX ORDER — CYCLOSPORINE 0.5 MG/ML
1 EMULSION OPHTHALMIC 2 TIMES DAILY
Qty: 180 EACH | Refills: 3 | Status: SHIPPED | OUTPATIENT
Start: 2024-09-12 | End: 2024-12-11

## 2024-09-18 ENCOUNTER — APPOINTMENT (OUTPATIENT)
Dept: PRIMARY CARE | Facility: CLINIC | Age: 34
End: 2024-09-18
Payer: COMMERCIAL

## 2024-09-18 ENCOUNTER — LAB (OUTPATIENT)
Dept: LAB | Facility: LAB | Age: 34
End: 2024-09-18
Payer: COMMERCIAL

## 2024-09-18 VITALS
OXYGEN SATURATION: 97 % | RESPIRATION RATE: 16 BRPM | SYSTOLIC BLOOD PRESSURE: 117 MMHG | WEIGHT: 203.4 LBS | DIASTOLIC BLOOD PRESSURE: 81 MMHG | TEMPERATURE: 98.1 F | BODY MASS INDEX: 34.72 KG/M2 | HEIGHT: 64 IN | HEART RATE: 82 BPM

## 2024-09-18 DIAGNOSIS — Z00.00 ROUTINE HEALTH MAINTENANCE: Primary | ICD-10-CM

## 2024-09-18 DIAGNOSIS — Z12.31 ENCOUNTER FOR SCREENING MAMMOGRAM FOR MALIGNANT NEOPLASM OF BREAST: ICD-10-CM

## 2024-09-18 DIAGNOSIS — E03.9 HYPOTHYROIDISM, UNSPECIFIED TYPE: ICD-10-CM

## 2024-09-18 DIAGNOSIS — Z00.00 ROUTINE HEALTH MAINTENANCE: ICD-10-CM

## 2024-09-18 PROCEDURE — 3008F BODY MASS INDEX DOCD: CPT | Performed by: INTERNAL MEDICINE

## 2024-09-18 PROCEDURE — 99395 PREV VISIT EST AGE 18-39: CPT | Performed by: INTERNAL MEDICINE

## 2024-09-18 PROCEDURE — 87624 HPV HI-RISK TYP POOLED RSLT: CPT

## 2024-09-18 PROCEDURE — 80076 HEPATIC FUNCTION PANEL: CPT

## 2024-09-18 PROCEDURE — 80061 LIPID PANEL: CPT

## 2024-09-18 PROCEDURE — 1036F TOBACCO NON-USER: CPT | Performed by: INTERNAL MEDICINE

## 2024-09-18 PROCEDURE — 84443 ASSAY THYROID STIM HORMONE: CPT

## 2024-09-18 PROCEDURE — 36415 COLL VENOUS BLD VENIPUNCTURE: CPT

## 2024-09-18 ASSESSMENT — PATIENT HEALTH QUESTIONNAIRE - PHQ9
2. FEELING DOWN, DEPRESSED OR HOPELESS: NOT AT ALL
1. LITTLE INTEREST OR PLEASURE IN DOING THINGS: NOT AT ALL
SUM OF ALL RESPONSES TO PHQ9 QUESTIONS 1 AND 2: 0

## 2024-09-18 ASSESSMENT — ENCOUNTER SYMPTOMS
EYE PAIN: 1
HEMATURIA: 0
CONSTIPATION: 0
NAUSEA: 0
POLYPHAGIA: 0
CHILLS: 0
BLOOD IN STOOL: 0
RHINORRHEA: 0
WHEEZING: 0
ARTHRALGIAS: 0
MYALGIAS: 0
FREQUENCY: 0
DYSPHORIC MOOD: 0
VOMITING: 0
HEADACHES: 0
FEVER: 0
COUGH: 0
DYSURIA: 0
SHORTNESS OF BREATH: 0
WOUND: 0
DIZZINESS: 0
PALPITATIONS: 0
ABDOMINAL PAIN: 0
SORE THROAT: 0
DIARRHEA: 0
UNEXPECTED WEIGHT CHANGE: 0
NERVOUS/ANXIOUS: 0
POLYDIPSIA: 0
CHEST TIGHTNESS: 0

## 2024-09-18 NOTE — PROGRESS NOTES
"Subjective   Patient ID: Rajwinder Marley is a 33 y.o. female who presents for Annual Exam.    HPI     Here for annual  On plaquenil and feels much better    Feels well with bupropion  Has very dry eyes    Dental visits- UTD  Eye visits-UTD    Exercise-Lift and runs  Diet- states poor, going to see nutritionist    Alcohol use-1x per 2 week  Smoking-none    Review of Systems   Constitutional:  Negative for chills, fever and unexpected weight change.   HENT:  Negative for congestion, hearing loss, rhinorrhea and sore throat.    Eyes:  Positive for pain (from dry eye). Negative for visual disturbance.   Respiratory:  Negative for cough, chest tightness, shortness of breath and wheezing.    Cardiovascular:  Negative for chest pain and palpitations.   Gastrointestinal:  Negative for abdominal pain, blood in stool, constipation, diarrhea, nausea and vomiting.   Endocrine: Negative for cold intolerance, heat intolerance, polydipsia and polyphagia.   Genitourinary:  Negative for dysuria, frequency and hematuria.   Musculoskeletal:  Negative for arthralgias and myalgias.   Skin:  Negative for rash and wound.   Neurological:  Negative for dizziness, syncope and headaches.   Psychiatric/Behavioral:  Negative for dysphoric mood. The patient is not nervous/anxious.        Objective   /81 (BP Location: Left arm, Patient Position: Sitting, BP Cuff Size: Large adult)   Pulse 82   Temp 36.7 °C (98.1 °F)   Resp 16   Ht 1.619 m (5' 3.75\")   Wt 92.3 kg (203 lb 6.4 oz)   SpO2 97%   BMI 35.19 kg/m²     Physical Exam  Vitals reviewed.   Constitutional:       Appearance: Normal appearance. She is not ill-appearing.   HENT:      Head: Normocephalic and atraumatic.      Right Ear: Tympanic membrane normal.      Left Ear: Tympanic membrane normal.      Nose: Nose normal.      Mouth/Throat:      Mouth: Mucous membranes are moist.      Pharynx: Oropharynx is clear.   Eyes:      Extraocular Movements: Extraocular movements intact.      " Conjunctiva/sclera: Conjunctivae normal.      Pupils: Pupils are equal, round, and reactive to light.   Cardiovascular:      Rate and Rhythm: Normal rate and regular rhythm.   Pulmonary:      Effort: Pulmonary effort is normal.      Breath sounds: Normal breath sounds. No wheezing.   Chest:   Breasts:     Right: Normal. No mass.      Left: Normal. No mass.   Abdominal:      General: There is no distension.      Palpations: Abdomen is soft. There is no mass.      Tenderness: There is no abdominal tenderness.   Genitourinary:     General: Normal vulva.      Vagina: Normal.      Cervix: Normal.   Musculoskeletal:      Cervical back: Neck supple.      Right lower leg: No edema.      Left lower leg: No edema.   Lymphadenopathy:      Cervical: No cervical adenopathy.   Neurological:      General: No focal deficit present.      Mental Status: She is alert and oriented to person, place, and time.      Gait: Gait normal.   Psychiatric:         Mood and Affect: Mood normal.         Behavior: Behavior normal.         Thought Content: Thought content normal.         Assessment/Plan   Problem List Items Addressed This Visit    None  Visit Diagnoses         Codes    Routine health maintenance    -  Primary Z00.00    Relevant Orders    Lipid Panel    Hepatic function panel    Encounter for screening mammogram for malignant neoplasm of breast     Z12.31    Relevant Orders    BI mammo bilateral screening tomosynthesis             CPE completed.  Advised to keep a heart healthy, low fat diet with fruits and veggies like Mediterranean diet.  Advised on the importance of exercise and maintaining 150 minutes of exercise per week (30 minutes per day 5 days a week).  Advised on regular eye and dental visits.  Discussed age appropriate cancer screening, immunizations and recommendations given.  Discussed avoiding illicit drugs and tobacco. Advised on appropriate use of alcohol.  Advised to wear seat belt.     Hypothyroidism: check      SLE/Sjogren's: positive SSA and SSB. Follows with rheum and eye doc--on plaquenil  -hx of malar rash     Anxiety: on bupropion, seeing psychiatry     Family hx of breast cancer: no one in family has done genetics, send for mammo and send for genetics     Pap today  Check mammo      to her wife Chanda. 2 kids

## 2024-09-19 LAB
ALBUMIN SERPL BCP-MCNC: 4.5 G/DL (ref 3.4–5)
ALP SERPL-CCNC: 67 U/L (ref 33–110)
ALT SERPL W P-5'-P-CCNC: 27 U/L (ref 7–45)
AST SERPL W P-5'-P-CCNC: 22 U/L (ref 9–39)
BILIRUB DIRECT SERPL-MCNC: 0.1 MG/DL (ref 0–0.3)
BILIRUB SERPL-MCNC: 0.5 MG/DL (ref 0–1.2)
CHOLEST SERPL-MCNC: 199 MG/DL (ref 0–199)
CHOLESTEROL/HDL RATIO: 3
HDLC SERPL-MCNC: 65.7 MG/DL
LDLC SERPL CALC-MCNC: 107 MG/DL
NON HDL CHOLESTEROL: 133 MG/DL (ref 0–149)
PROT SERPL-MCNC: 7.7 G/DL (ref 6.4–8.2)
TRIGL SERPL-MCNC: 131 MG/DL (ref 0–149)
TSH SERPL-ACNC: 3.5 MIU/L (ref 0.44–3.98)
VLDL: 26 MG/DL (ref 0–40)

## 2024-09-30 LAB
CYTOLOGY CMNT CVX/VAG CYTO-IMP: NORMAL
HPV HR 12 DNA GENITAL QL NAA+PROBE: NEGATIVE
HPV HR GENOTYPES PNL CVX NAA+PROBE: NEGATIVE
HPV16 DNA SPEC QL NAA+PROBE: NEGATIVE
HPV18 DNA SPEC QL NAA+PROBE: NEGATIVE
LAB AP HPV GENOTYPE QUESTION: YES
LAB AP HPV HR: NORMAL
LABORATORY COMMENT REPORT: NORMAL
LABORATORY COMMENT REPORT: NORMAL
PATH REPORT.TOTAL CANCER: NORMAL

## 2024-10-21 DIAGNOSIS — E03.9 HYPOTHYROIDISM, UNSPECIFIED: ICD-10-CM

## 2024-10-21 RX ORDER — LEVOTHYROXINE SODIUM 125 UG/1
125 TABLET ORAL DAILY
Qty: 90 TABLET | Refills: 1 | Status: SHIPPED | OUTPATIENT
Start: 2024-10-21

## 2024-11-12 DIAGNOSIS — B00.1 HERPES LABIALIS: Primary | ICD-10-CM

## 2024-11-12 RX ORDER — VALACYCLOVIR HYDROCHLORIDE 500 MG/1
500 TABLET, FILM COATED ORAL DAILY
Qty: 90 TABLET | Refills: 1 | Status: SHIPPED | OUTPATIENT
Start: 2024-11-12 | End: 2025-11-12

## 2024-12-19 ENCOUNTER — TELEPHONE (OUTPATIENT)
Dept: OPHTHALMOLOGY | Facility: CLINIC | Age: 34
End: 2024-12-19

## 2024-12-23 DIAGNOSIS — M35.00 SJOGREN SYNDROME, UNSPECIFIED (MULTI): ICD-10-CM

## 2024-12-23 RX ORDER — HYDROXYCHLOROQUINE SULFATE 200 MG/1
200 TABLET, FILM COATED ORAL 2 TIMES DAILY
Qty: 180 TABLET | Refills: 0 | Status: SHIPPED | OUTPATIENT
Start: 2024-12-23

## 2025-01-08 ENCOUNTER — ANCILLARY PROCEDURE (OUTPATIENT)
Dept: URGENT CARE | Age: 35
End: 2025-01-08
Payer: COMMERCIAL

## 2025-01-08 ENCOUNTER — OFFICE VISIT (OUTPATIENT)
Dept: URGENT CARE | Age: 35
End: 2025-01-08
Payer: COMMERCIAL

## 2025-01-08 ENCOUNTER — APPOINTMENT (OUTPATIENT)
Dept: URGENT CARE | Age: 35
End: 2025-01-08
Payer: COMMERCIAL

## 2025-01-08 VITALS
HEART RATE: 100 BPM | DIASTOLIC BLOOD PRESSURE: 78 MMHG | TEMPERATURE: 97.2 F | RESPIRATION RATE: 16 BRPM | HEIGHT: 64 IN | OXYGEN SATURATION: 98 % | SYSTOLIC BLOOD PRESSURE: 117 MMHG | WEIGHT: 190 LBS | BODY MASS INDEX: 32.44 KG/M2

## 2025-01-08 DIAGNOSIS — R05.1 ACUTE COUGH: ICD-10-CM

## 2025-01-08 DIAGNOSIS — R05.1 ACUTE COUGH: Primary | ICD-10-CM

## 2025-01-08 PROCEDURE — 99203 OFFICE O/P NEW LOW 30 MIN: CPT | Performed by: PHYSICIAN ASSISTANT

## 2025-01-08 PROCEDURE — 3008F BODY MASS INDEX DOCD: CPT | Performed by: PHYSICIAN ASSISTANT

## 2025-01-08 PROCEDURE — 1036F TOBACCO NON-USER: CPT | Performed by: PHYSICIAN ASSISTANT

## 2025-01-08 PROCEDURE — 71046 X-RAY EXAM CHEST 2 VIEWS: CPT | Performed by: PHYSICIAN ASSISTANT

## 2025-01-08 RX ORDER — PREDNISONE 20 MG/1
40 TABLET ORAL DAILY
Qty: 10 TABLET | Refills: 0 | Status: SHIPPED | OUTPATIENT
Start: 2025-01-08 | End: 2025-01-13

## 2025-01-08 RX ORDER — AMOXICILLIN AND CLAVULANATE POTASSIUM 875; 125 MG/1; MG/1
1 TABLET, FILM COATED ORAL 2 TIMES DAILY
Qty: 20 TABLET | Refills: 0 | Status: SHIPPED | OUTPATIENT
Start: 2025-01-08 | End: 2025-01-18

## 2025-01-08 RX ORDER — ALBUTEROL SULFATE 90 UG/1
2 INHALANT RESPIRATORY (INHALATION) EVERY 4 HOURS PRN
Qty: 8 G | Refills: 0 | Status: SHIPPED | OUTPATIENT
Start: 2025-01-08 | End: 2026-01-08

## 2025-01-08 RX ORDER — INHALER, ASSIST DEVICES
SPACER (EA) MISCELLANEOUS
Qty: 1 EACH | Refills: 0 | Status: SHIPPED | OUTPATIENT
Start: 2025-01-08

## 2025-01-08 NOTE — PROGRESS NOTES
"Subjective   Patient ID: Rajwinder Marley is a 34 y.o. female. They present today with a chief complaint of Cough (X 8 weeks. Dry cough. Pt starts to get better, then gets worse.) and Sinusitis (Pressure and congestion, clicking in ears).    CC: URI symptoms/cough x 8 weeks    HPI: Patient presenting for URI symptoms.  Symptoms include runny nose, congestion, and a semiproductive/wet cough x 8 weeks.  No fever, chills, myalgias, abdominal pain, nausea, vomiting, diarrhea, rash.  No chest pain or shortness of breath.  No history of clots or clotting disorders.  No lower extremity swelling edema or pain.  No recent travel or surgeries.    Past Medical History  Allergies as of 01/08/2025    (No Known Allergies)       (Not in a hospital admission)      Past Medical History:   Diagnosis Date    Personal history of other endocrine, nutritional and metabolic disease 03/05/2019    History of thyroid disorder       Past Surgical History:   Procedure Laterality Date    OTHER SURGICAL HISTORY  09/16/2019    No history of surgery        reports that she has never smoked. She has never used smokeless tobacco. She reports current alcohol use. She reports current drug use. Drug: Marijuana.    Review of Systems  Review of Systems    After reviewing all body systems I have documented pertinent findings above in the history.  All other Systems reviewed and are negative for complaint.  Pertinent positive and negatives are listed in the above HPI.    Objective    Vitals:    01/08/25 1323   BP: 117/78   BP Location: Right arm   Patient Position: Sitting   BP Cuff Size: Large adult   Pulse: 100   Resp: 16   Temp: 36.2 °C (97.2 °F)   TempSrc: Oral   SpO2: 98%   Weight: 86.2 kg (190 lb)   Height: 1.626 m (5' 4\")     Patient's last menstrual period was 12/23/2024 (exact date).  Physical Exam    General: Alert, oriented, and cooperative.  No acute distress. Well developed, well nourished.     Skin: Skin is warm, and dry. No rashes or " lesions.    Eyes: Sclera and conjunctivae normal     Ears: TM´s are intact, pink/grey, with mild effusions bilaterally.  No significant erythema.  No hemotympanum or rupture. Bilateral auricle, helix, and tragus without inflammation or erythema.  No mastoid erythema, or tenderness.  No deformities. Right and left canal negative for erythema, or discharge.  Hearing is grossly intact bilaterally    Mouth/Throat: Pharynx is erythematous.  Tonsils are equal in size.  No exudates.  No angioedema of the lips or tongue.  No respiratory compromise, tripoding, drooling, muffled voice,  stridor, or trismus.     Neck: Supple.     Cardiac: Regular rate     Respiratory:  No acute respiratory distress.  Regular rate of breathing.  No accessory muscle use.  No tripoding.  Lungs are clear bilaterally.    Abdomen: Soft, nontender.    Musculoskeletal: Upper and lower extremities are atraumatic in appearance without deformity, erythema, edema, and atrophy. No crepitus, or tenderness. Compartments are all soft.    Procedures    Point of Care Test & Imaging Results from this visit    XR chest 2 views    Result Date: 1/8/2025  Interpreted By:  Shikha Pedraza, STUDY: XR CHEST 2 VIEWS 1/8/2025 1:44 pm   INDICATION: Signs/Symptoms:cough   COMPARISON: None available.   ACCESSION NUMBER(S): [id number]   ORDERING CLINICIAN: MERT SAPP   TECHNIQUE: PA and lateral views of the chest were acquired.   FINDINGS: Normal heart, mediastinum, and lungs. Bony thorax appears intact.       Normal chest.   Signed by: Shikha Pedraza 1/8/2025 2:03 PM Dictation workstation:   [id number]     Diagnostic study results (if any) were reviewed by Mert Sapp PA-C.    Assessment/Plan   Allergies, medications, history, and pertinent labs/EKGs/Imaging reviewed by Mert Sapp PA-C.     MDM:  Patient presenting for URI type symptoms x 8 weeks.  Symptoms seem to  wax and wane without fully resolving.  Cough is semiproductive.  No fever.  No chest pain or  shortness of breath.  No recent travel or surgeries.  No lower extremity swelling or pain.  No relief despite taking over-the-counter medications.      Patient overall looks well.  Speaks in complete sentences.  No evidence of acute distress or respiratory compromise.  No tripoding. No nasal flaring. No clinical signs or history to suggest meningitis, Anival´s, peritonsillar abscess, epiglottitis, or asthma.  Due length and worsening of  symptoms a bacterial respiratory tract infection is considered the most likely cause.  Through shared decision making the patient was prescribed an antimicrobial as a treatment measure.  Advised supportive therapy with over the counter medication and follow-up with a PCP in the next few days. Pt/family instructed to return to the urgent care or emergency department if symptoms worsen or if new symptoms develop. Patient/family expressed understanding and consented to the above plan. No barriers of communication were apparent.      Orders and Diagnoses  Diagnoses and all orders for this visit:  Acute cough  -     XR chest 2 views; Future  -     albuterol (Ventolin HFA) 90 mcg/actuation inhaler; Inhale 2 puffs every 4 hours if needed for wheezing or shortness of breath.  -     predniSONE (Deltasone) 20 mg tablet; Take 2 tablets (40 mg) by mouth once daily for 5 days.  -     amoxicillin-pot clavulanate (Augmentin) 875-125 mg tablet; Take 1 tablet by mouth 2 times a day for 10 days.  -     inhalational spacing device (AeroTrach Plus) inhaler; Use as instructed      Medical Admin Record      Patient disposition: Home    Electronically signed by Fitz Multani PA-C  2:06 PM

## 2025-01-08 NOTE — PATIENT INSTRUCTIONS
You were seen for cold like symptoms.  You most likely have bronchitis/respiratory tract infection.  I recommend supportive therapy with the following medications below.    PLAN:  1.  Please take antibiotic as prescribed    2.  You can use Tea and honey for cough. This is known to work better than most OTC medications.    3.  Mucinex can break up congestion in adults.        Aller-Crystal or over-the-counter children's cold medication such as Dimetapp can be beneficial for symptoms in kids.    4.  Stay well-hydrated and drink lots of fluids.    5.  Follow up with your primary care physician in the next few days for reevaluation, especially if symptoms are not improving.    6.  Return to the urgent care or emergency department if symptoms worsen or new symptoms develop.    Based on clinical exam findings and history you most likely have a upper respiratory tract infection.  Your initial illness was likely caused by a virus that progressed to a secondary bacterial infection.  You are contagious as soon as the symptoms start.  Your symptoms may persist up to 2-3 weeks.  Symptoms usually peak by days 3 or 5.  Typical symptoms include nasal congestion, a runny nose, scratchy throat, cough, and irritability. The diagnosis is based on symptoms. Good hand hygiene is the best way to prevent these infections, and routine vaccination can help prevent influenza. Treatment aims to relieve symptoms. Rest and fluids. Tylenol and/or ibuprofen for fever and pain. Over the counter decongestants or mucolytics.  All do not take medications that may be contraindicated by another medical condition.

## 2025-02-06 ENCOUNTER — APPOINTMENT (OUTPATIENT)
Dept: RHEUMATOLOGY | Facility: CLINIC | Age: 35
End: 2025-02-06
Payer: COMMERCIAL

## 2025-03-31 ENCOUNTER — TELEPHONE (OUTPATIENT)
Dept: OPHTHALMOLOGY | Age: 35
End: 2025-03-31
Payer: COMMERCIAL

## 2025-04-04 DIAGNOSIS — M35.00 SJOGREN SYNDROME, UNSPECIFIED (MULTI): ICD-10-CM

## 2025-04-04 RX ORDER — HYDROXYCHLOROQUINE SULFATE 200 MG/1
TABLET, FILM COATED ORAL 2 TIMES DAILY
Qty: 180 TABLET | Refills: 0 | Status: SHIPPED | OUTPATIENT
Start: 2025-04-04

## 2025-04-14 ENCOUNTER — APPOINTMENT (OUTPATIENT)
Dept: OPHTHALMOLOGY | Facility: CLINIC | Age: 35
End: 2025-04-14
Payer: COMMERCIAL

## 2025-04-14 DIAGNOSIS — H47.231 OPTIC CUPPING OF RIGHT EYE: ICD-10-CM

## 2025-04-14 DIAGNOSIS — H16.223 KERATOCONJUNCTIVITIS SICCA OF BOTH EYES DUE TO DECREASED TEAR PRODUCTION: ICD-10-CM

## 2025-04-14 DIAGNOSIS — Z79.899 LONG-TERM USE OF PLAQUENIL: ICD-10-CM

## 2025-04-14 DIAGNOSIS — M35.01 SJOGREN SYNDROME WITH KERATOCONJUNCTIVITIS: ICD-10-CM

## 2025-04-14 DIAGNOSIS — H52.223 REGULAR ASTIGMATISM OF BOTH EYES: ICD-10-CM

## 2025-04-14 DIAGNOSIS — H52.13 MYOPIA OF BOTH EYES: Primary | ICD-10-CM

## 2025-04-14 PROCEDURE — FLVLF CONTACT LENS EVALUATION (SP): Performed by: OPTOMETRIST

## 2025-04-14 PROCEDURE — 92015 DETERMINE REFRACTIVE STATE: CPT | Performed by: OPTOMETRIST

## 2025-04-14 PROCEDURE — 83516 IMMUNOASSAY NONANTIBODY: CPT | Performed by: OPTOMETRIST

## 2025-04-14 PROCEDURE — 92014 COMPRE OPH EXAM EST PT 1/>: CPT | Performed by: OPTOMETRIST

## 2025-04-14 PROCEDURE — 92083 EXTENDED VISUAL FIELD XM: CPT | Performed by: OPTOMETRIST

## 2025-04-14 PROCEDURE — 92134 CPTRZ OPH DX IMG PST SGM RTA: CPT | Mod: BILATERAL PROCEDURE | Performed by: OPTOMETRIST

## 2025-04-14 PROCEDURE — V2520 CONTACT LENS HYDROPHILIC: HCPCS | Performed by: OPTOMETRIST

## 2025-04-14 RX ORDER — CYCLOSPORINE 0.5 MG/ML
1 EMULSION OPHTHALMIC 2 TIMES DAILY
Qty: 180 EACH | Refills: 3 | Status: SHIPPED | OUTPATIENT
Start: 2025-04-14 | End: 2026-04-14

## 2025-04-14 ASSESSMENT — SCHIRMERS TEST
OS_SCHIRMERS: 0
OD_SCHIRMERS: 0

## 2025-04-14 ASSESSMENT — PACHYMETRY
OD_CT(UM): 526
OS_CT(UM): 531

## 2025-04-14 ASSESSMENT — REFRACTION_CURRENTRX
OS_DIAMETER: 14.2
OD_BRAND: INFUSE
OD_BASECURVE: 8.6
OS_SPHERE: -3.25
OS_SPHERE: -3.25
OS_CYLINDER: SPHERE
OD_DIAMETER: 14.1
OD_SPHERE: -3.25
OD_SPHERE: -3.25
OD_BASECURVE: 8.6
OS_BRAND: INFUSE
OD_CYLINDER: SPHERE
OS_BASECURVE: 8.6
OD_BRAND: CLARITI 1 DAY
OS_DIAMETER: 14.1
OS_CYLINDER: SPHERE
OD_CYLINDER: SPHERE
OD_DIAMETER: 14.2
OS_BRAND: CLARITI 1 DAY
OS_BASECURVE: 8.6

## 2025-04-14 ASSESSMENT — REFRACTION
OS_SPHERE: -3.00
OD_CYLINDER: -1.00
OD_AXIS: 120
OS_CYLINDER: -0.50
OS_AXIS: 080
OD_SPHERE: -2.50

## 2025-04-14 ASSESSMENT — CONF VISUAL FIELD
OS_NORMAL: 1
OD_NORMAL: 1
OD_INFERIOR_TEMPORAL_RESTRICTION: 0
OS_INFERIOR_TEMPORAL_RESTRICTION: 0
OS_INFERIOR_NASAL_RESTRICTION: 0
OD_INFERIOR_NASAL_RESTRICTION: 0
OD_SUPERIOR_NASAL_RESTRICTION: 0
METHOD: COUNTING FINGERS
OD_SUPERIOR_TEMPORAL_RESTRICTION: 0
OS_SUPERIOR_NASAL_RESTRICTION: 0
OS_SUPERIOR_TEMPORAL_RESTRICTION: 0

## 2025-04-14 ASSESSMENT — SLIT LAMP EXAM - LIDS
COMMENTS: 2+ MGD
COMMENTS: 2+MGD

## 2025-04-14 ASSESSMENT — ENCOUNTER SYMPTOMS
HEMATOLOGIC/LYMPHATIC NEGATIVE: 0
NEUROLOGICAL NEGATIVE: 0
RESPIRATORY NEGATIVE: 0
GASTROINTESTINAL NEGATIVE: 0
PSYCHIATRIC NEGATIVE: 0
CARDIOVASCULAR NEGATIVE: 0
EYES NEGATIVE: 1
ALLERGIC/IMMUNOLOGIC NEGATIVE: 0
CONSTITUTIONAL NEGATIVE: 0
ENDOCRINE NEGATIVE: 0
MUSCULOSKELETAL NEGATIVE: 0

## 2025-04-14 ASSESSMENT — REFRACTION_MANIFEST
OS_SPHERE: -3.00
OD_AXIS: 120
OS_CYLINDER: -0.50
OD_SPHERE: -2.25
OS_AXIS: 080
OD_CYLINDER: -1.25

## 2025-04-14 ASSESSMENT — TONOMETRY
IOP_METHOD: TONOPEN
OD_IOP_MMHG: 10
OS_IOP_MMHG: 14

## 2025-04-14 ASSESSMENT — VISUAL ACUITY
OS_CC: 20/20-1
VA_OR_OD_CURRENT_RX: 20/20
CORRECTION_TYPE: CONTACTS
METHOD: SNELLEN - LINEAR
VA_OR_OS_CURRENT_RX: 20/20
OD_CC: 20/20-1

## 2025-04-14 ASSESSMENT — CUP TO DISC RATIO: OD_RATIO: 0.55

## 2025-04-14 ASSESSMENT — EXTERNAL EXAM - LEFT EYE: OS_EXAM: NORMAL

## 2025-04-14 ASSESSMENT — EXTERNAL EXAM - RIGHT EYE: OD_EXAM: NORMAL

## 2025-04-14 ASSESSMENT — TEAR MENISCUS
OD_TEAR_MENISCUS: LOW
OS_TEAR_MENISCUS: LOW

## 2025-04-14 NOTE — PROGRESS NOTES
Assessment/Plan   Diagnoses and all orders for this visit:  Myopia of both eyes  Regular astigmatism of both eyes  New spec rx released today per patient request. Ocular health wnl for age OU. Monitor 1 year or sooner prn. Refraction billed today. Pt consents to receiving glasses Rx today. Patient's/guardian's signature obtained to acknowledge and confirm that a paper copy of glasses Rx was given to patient in compliance with [facility] Eyeglass Rule. Electronic copy of Rx will also be available via "[employer]/EPIC.   Discussed proper wear, care, replacement of contact lenses. Gave handout. D/c cl wear and RTC if eyes become red, painful, irritated. Monitor 1 year.   CL eval billed today. $35 Pt prefers infuse CL.     Long-term use of Plaquenil  -     OCT, Retina - OU - Both Eyes  -     Martin Visual Field - OU - Both Eyes  Sjogren syndrome with keratoconjunctivitis  Studies reveal that the risk of maculopathy when taking Plaquenil is 0% (0-5 years), 1% (over 5 years), 2% (over 10 years), and 20% cumulative, or 4% annual incidence (over 20 years) respectively.  Annual testing with 10-2 threshold visual field perimetry, as well as spectral domain optical coherence tomography of the macula is recommended. No signs of plaquenil toxicity today od/os, macula OCT today, HVF 10-2 today  monitor 1 year or sooner with any acute vision change. HVF 10-2 and OCT Mac at next comprehensive exam.    Keratoconjunctivitis sicca of both eyes due to decreased tear production  Continue w/ restasis bid OU, consider tyrvaya, cequa/vevye if symptoms signs worsen    Optic cupping of right eye  Next visit OCT RNFL as well as plaquenil testing for monitoring. Physiologic asymmetry noted in 2020.

## 2025-04-14 NOTE — Clinical Note
No HCQ toxicity on exam or testing. Will monitor in 1 year w/ repeat exam and testing. Pt reports that symptoms of dry eye are well controlled at this time. Still low tear production, but in setting of sjogrens this is not abnormal. Discussed options for additional intervention for dry eye but patient declines at this time.

## 2025-04-14 NOTE — Clinical Note
Both eyes (OU) Contact Lens Order Contact Lens Current Rx      Current Contact Lens Rx #2 (Ordered)      Brand Base Curve Diameter Sphere Cylinder Dist VA Over-Sphere Over-Cyl  Over-Axis   Right Infuse 8.6 14.2 -3.25 Sphere       Left Infuse 8.6 14.2 -3.25 Sphere           Quantity: 2 Package: 90 PK Appointment needed? No Medically necessary? No Ship To: Home Additional instructions: Please order 2 90pk and mail to patient. Charges in Epic. Just an YOLANDA South previously ordered lenses for her but ordered the clariti instead of the Infuse, I put a note in the chart stating that she prefers infuse so hopefully that doesn't happen again.

## 2025-07-02 ENCOUNTER — PATIENT MESSAGE (OUTPATIENT)
Dept: RHEUMATOLOGY | Facility: CLINIC | Age: 35
End: 2025-07-02
Payer: COMMERCIAL

## 2025-07-02 DIAGNOSIS — M35.00 SJOGREN SYNDROME, UNSPECIFIED (MULTI): ICD-10-CM

## 2025-07-03 DIAGNOSIS — H16.223 KERATOCONJUNCTIVITIS SICCA OF BOTH EYES DUE TO DECREASED TEAR PRODUCTION: ICD-10-CM

## 2025-07-03 RX ORDER — CYCLOSPORINE 0.5 MG/ML
1 EMULSION OPHTHALMIC 2 TIMES DAILY
Qty: 180 EACH | Refills: 3 | Status: SHIPPED | OUTPATIENT
Start: 2025-07-03 | End: 2026-07-03

## 2025-07-03 RX ORDER — HYDROXYCHLOROQUINE SULFATE 200 MG/1
200 TABLET, FILM COATED ORAL 2 TIMES DAILY
Qty: 180 TABLET | Refills: 3 | Status: SHIPPED | OUTPATIENT
Start: 2025-07-03

## 2025-07-06 DIAGNOSIS — B00.1 HERPES LABIALIS: ICD-10-CM

## 2025-07-07 RX ORDER — VALACYCLOVIR HYDROCHLORIDE 500 MG/1
500 TABLET, FILM COATED ORAL DAILY
Qty: 90 TABLET | Refills: 0 | Status: SHIPPED | OUTPATIENT
Start: 2025-07-07 | End: 2025-07-11 | Stop reason: SDUPTHER

## 2025-07-10 ENCOUNTER — PATIENT MESSAGE (OUTPATIENT)
Dept: PRIMARY CARE | Facility: CLINIC | Age: 35
End: 2025-07-10
Payer: COMMERCIAL

## 2025-07-10 DIAGNOSIS — E03.9 HYPOTHYROIDISM, UNSPECIFIED: ICD-10-CM

## 2025-07-10 DIAGNOSIS — B00.1 HERPES LABIALIS: ICD-10-CM

## 2025-07-11 RX ORDER — LEVOTHYROXINE SODIUM 125 UG/1
125 TABLET ORAL DAILY
Qty: 90 TABLET | Refills: 1 | Status: SHIPPED | OUTPATIENT
Start: 2025-07-11

## 2025-07-11 RX ORDER — VALACYCLOVIR HYDROCHLORIDE 500 MG/1
500 TABLET, FILM COATED ORAL DAILY
Qty: 90 TABLET | Refills: 0 | Status: SHIPPED | OUTPATIENT
Start: 2025-07-11

## 2025-09-22 ENCOUNTER — APPOINTMENT (OUTPATIENT)
Dept: PRIMARY CARE | Facility: CLINIC | Age: 35
End: 2025-09-22
Payer: COMMERCIAL

## 2026-04-17 ENCOUNTER — APPOINTMENT (OUTPATIENT)
Dept: OPHTHALMOLOGY | Facility: CLINIC | Age: 36
End: 2026-04-17
Payer: COMMERCIAL